# Patient Record
Sex: MALE | Race: WHITE | Employment: OTHER | URBAN - METROPOLITAN AREA
[De-identification: names, ages, dates, MRNs, and addresses within clinical notes are randomized per-mention and may not be internally consistent; named-entity substitution may affect disease eponyms.]

---

## 2017-05-10 ENCOUNTER — HOSPITAL ENCOUNTER (OUTPATIENT)
Dept: ULTRASOUND IMAGING | Age: 42
Discharge: HOME | End: 2017-05-10
Attending: FAMILY MEDICINE
Payer: COMMERCIAL

## 2017-05-10 DIAGNOSIS — R10.9 ABDOMINAL PAIN, UNSPECIFIED SITE: ICD-10-CM

## 2017-05-10 PROCEDURE — 76700 US EXAM ABDOM COMPLETE: CPT

## 2017-08-04 ENCOUNTER — OFFICE VISIT (OUTPATIENT)
Dept: HEMATOLOGY | Age: 42
End: 2017-08-04

## 2017-08-04 VITALS
OXYGEN SATURATION: 97 % | TEMPERATURE: 97.3 F | WEIGHT: 249 LBS | DIASTOLIC BLOOD PRESSURE: 75 MMHG | SYSTOLIC BLOOD PRESSURE: 124 MMHG | HEART RATE: 83 BPM | HEIGHT: 68 IN | BODY MASS INDEX: 37.74 KG/M2

## 2017-08-04 DIAGNOSIS — K76.0 FATTY LIVER: Primary | ICD-10-CM

## 2017-08-04 PROBLEM — L40.9 PSORIASIS: Status: ACTIVE | Noted: 2017-08-04

## 2017-08-04 NOTE — PROGRESS NOTES
[address] [place], [address], [facility], [place]       CADENCE Higgins PA-C Alvira Mohair, NP Lorrane Collins, NP        at [address]     [address] [place] [address], [address] Matilde Rogers  22.     [phone]     FAX: [phone]    at [address], [id number] [place] [address], [address]     [phone]     FAX: [phone]         Patient Care Team:  Ana Branham MD as PCP - General (Family Practice)  Adelaide Newman DO (Dermatology)      Problem List  Date Reviewed: 8/4/2017          Codes Class Noted    Psoriasis ICD-10-CM: L40.9  ICD-9-CM: 696.1  8/4/2017        Fatty liver ICD-10-CM: K76.0  ICD-9-CM: 571.8  8/4/2017                The physicians listed above have asked me to see Vikram Daily in consultation regarding suspected fatty liver disease and its management. All medical records sent by the referring physicians were reviewed including imaging studies     The patient is a 38 y.o.  male who is suspected to have fatty liver disease based upon ultrasound. Serologic evaluation was either not performed or available to me. Ultrasound of the liver was performed in 5/2017. The results of the imaging suggested fatty liver disease. An assessment of liver fibrosis with biopsy or elastography has not been performed. The most recent laboratory studies are not available. The patient notes fatigue,     The patient completes all daily activities without any functional limitations. The patient has not experienced pain in the right side over the liver,     ALLERGIES  Not on File    MEDICATIONS  No current outpatient prescriptions on file. No current facility-administered medications for this visit. SYSTEM REVIEW NOT RELATED TO LIVER DISEASE OR REVIEWED ABOVE:  Constitution systems: Negative for fever, chills, weight gain, weight loss.    Eyes: Negative for visual changes. ENT: Negative for sore throat, painful swallowing. Respiratory: Negative for cough, hemoptysis, SOB. Cardiology: Negative for chest pain, palpitations. GI:  Negative for constipation or diarrhea. : Negative for urinary frequency, dysuria, hematuria, nocturia. Skin: Negative for rash. Hematology: Negative for easy bruising, blood clots. Musculo-skelatal: Negative for back pain, muscle pain, weakness. Neurologic: Negative for headaches, dizziness, vertigo, memory problems not related to HE. Psychology: Negative for anxiety, depression. FAMILY HISTORY:  The father is alive and healthy. The mother is alive and healthy. There is no family history of liver disease. SOCIAL HISTORY:  The patient has never been . The patient has no children. The patient currently smokes 5 cigarettes per week. The patient consumes alcohol on social occasions never in excess. The patient currently works full time . PHYSICAL EXAMINATION:  Visit Vitals    /75 (BP 1 Location: Left arm, BP Patient Position: Sitting)    Pulse 83    Temp 97.3 °F (36.3 °C) (Tympanic)    Ht 5' 8\" (1.727 m)    Wt 249 lb (112.9 kg)    SpO2 97%    BMI 37.86 kg/m2     General: No acute distress. Eyes: Sclera anicteric. ENT: No oral lesions. Thyroid normal.  Nodes: No adenopathy. Skin: No spider angiomata. No jaundice. No palmar erythema. Respiratory: Lungs clear to auscultation. Cardiovascular: Regular heart rate. No murmurs. No JVD. Abdomen: Soft non-tender. Liver size normal to percussion/palpation. Spleen not palpable. No obvious ascites. Extremities: No edema. No muscle wasting. No gross arthritic changes. Neurologic: Alert and oriented. Cranial nerves grossly intact. No asterixis.     LABORATORY STUDIES:  [facility] [place] [facility] [address] [place] [address] & Units 8/4/2017   WBC 3.4 - 10.8 x10E3/uL 9.3   ANC 1.4 - 7.0 x10E3/uL 6.3   HGB 12.6 - 17.7 g/dL 15.3    - 379 x10E3/uL 309   AST 0 - 40 IU/L 36   ALT 0 - 44 IU/L 78 (H)   Alk Phos 39 - 117 IU/L 59   Bili, Total 0.0 - 1.2 mg/dL 0.5   Bili, Direct 0.00 - 0.40 mg/dL 0.14   Albumin 3.5 - 5.5 g/dL 4.8   BUN 6 - 24 mg/dL 9   Creat 0.76 - 1.27 mg/dL 0.81   Na 134 - 144 mmol/L 142   K 3.5 - 5.2 mmol/L 4.7   Cl 96 - 106 mmol/L 101   CO2 18 - 29 mmol/L 26   Glucose 65 - 99 mg/dL 83     SEROLOGIES:  Serologies Latest Ref Rng & Units 8/4/2017   Hep A Ab, Total Negative Negative   Hep B Surface Ag Negative Negative   Hep B Core Ab, Total Negative Negative   Hep B Surface AB QL  Non Reactive   Hep C Ab 0.0 - 0.9 s/co ratio <0.1   Ferritin 30 - 400 ng/mL 308   Iron % Saturation 15 - 55 % 34     LIVER HISTOLOGY:  Not available or performed    ENDOSCOPIC PROCEDURES:  Not available or performed    RADIOLOGY:  5/2017. Ultrasound of liver. Echogenic consistent with chronic liver disease. No liver mass lesions. No dilated bile ducts. No ascites. OTHER TESTING:  Not available or performed    ASSESSMENT AND PLAN:  Suspected fatty liver disease of unclear histologic severity. Liver transaminases are elevated. Alkaline phosphate is normal.  Liver function is normal.  The platelet count is normal.      Based upon laboratory studies and imaging  the patient does not appear to have advanced fibrosis     Will perform laboratory testing to monitor liver function and degree of liver injury. This included BMP, hepatic panel, CBC with platelet count, INR. Will perform serologic and virologic studies to assess for other causes of chronic liver disease. Suspect the patient has fatty liver based upon ultrasound, an elevation in serum liver transaminases, and serology that is negative for all other causes of chronic liver disease. Only a liver biopsy can confirm is the patient does have fatty liver and differentiate benign steatosis from URRUTIA. The treatment is the same; weight reduction.  If the liver biopsy demonstrates URRUTIA the patient would be eligible for enrollment into clinical trials for treatment of URRUTIA. The need to perform liver biopsy to assess disease severity was discussed with the patient. The risks of performing the liver biopsy including pain, puncture of the lung, gallbladder, intestine or kidney and bleeding were discussed. Will defer liver biopsy for now. The need to perform an assessment of liver fibrosis was discussed with the patient. The Fibroscan can assess liver fibrosis and determine if a patient has advanced fibrosis or cirrhosis without the need for liver biopsy. The Fibroscan is currently available at [facility]. This will be performed at the next office visit. The patient was counseled regarding diet and exercise to achieve weight loss. The best diet for patients with fatty liver is one very low in carbohydrates and enriched with protein such as an Atkins program.      We will continue to monitor the patient at periodic intervals. If weight loss is successful the fat will resolve from the liver and liver enzymes should return to the normal range. We would then repeat an ultrasound to see if this also returns to normal.  If liver enzymes do not return to normal with weight reduction additional evaluation may be necessary over time. The patient was directed to continue all current medications at the current dosages. There are no contraindications for the patient to take any medications that are necessary for treatment of other medical issues including medications for diabetes mellitus and hypercholesterolemia. The patient was counseled regarding alcohol consumption and that this could contribute to fatty liver disease. Vaccination for viral hepatitis A and B is recommended since the patient has no serologic evidence of previous exposure or vaccination with immunity. All of the above issues were discussed with the patient.   All questions were answered. The patient expressed a clear understanding of the above. [address] [address] in 3 months to review all data and determine the treatment plan.     Aura Romberg, MD  [facility] [place] [address] 27[address] [facility] [address].  [phone]

## 2017-08-04 NOTE — MR AVS SNAPSHOT
Visit Information Date & Time Provider Department Dept. Phone Encounter #  
 8/4/2017 10:30 AM Nanda Garcia MD [facility] [address] [place] [address] [id number] Follow-up Instructions Return in about 2 months (around 10/4/2017) for FS with NP. Upcoming Health Maintenance Date Due DTaP/Tdap/Td series (1 - Tdap) 7/26/1998 INFLUENZA AGE 9 TO ADULT 8/1/2017 Allergies as of 8/4/2017  Review Complete On: 8/4/2017 By: Isma Bernstein LPN Not on File Current Immunizations  Never Reviewed No immunizations on file. Not reviewed this visit You Were Diagnosed With   
  
 Codes Comments Fatty liver    -  Primary ICD-10-CM: K76.0 ICD-9-CM: 571.8 Vitals BP Pulse Temp Height(growth percentile) 124/75 (BP 1 Location: Left arm, BP Patient Position: Sitting) 83 97.3 °F (36.3 °C) (Tympanic) 5' 8\" (1.727 m) Weight(growth percentile) SpO2 BMI Smoking Status 249 lb (112.9 kg) 97% 37.86 kg/m2 Current Some Day Smoker BMI and BSA Data Body Mass Index Body Surface Area  
 37.86 kg/m 2 2.33 m 2 Your Updated Medication List  
  
Notice  As of 8/4/2017 11:42 AM  
 You have not been prescribed any medications. We Performed the Following CBC WITH AUTOMATED DIFF [62197 CPT(R)] FERRITIN [20145 CPT(R)] HCV AB W/REFLEX VERIFICATION [[id number] Custom] HEP A AB, TOTAL [id number] CPT(R)] HEP B SURFACE AB [id number] CPT(R)] HEP B SURFACE AG [id number] CPT(R)] HEPATIC FUNCTION PANEL [62772 CPT(R)] HEPATITIS B CORE AB, TOTAL [id number] CPT(R)] IRON PROFILE S765335 CPT(R)] METABOLIC PANEL, BASIC [76658 CPT(R)] Follow-up Instructions Return in about 2 months (around 10/4/2017) for FS with NP. Introducing [facility]! Dear Janet Dobson: Thank you for requesting a [employer] account. Our records indicate that you already have an active [employer] account.   You can access your account anytime at [email or web address] Did you know that you can access your hospital and ER discharge instructions at any time in [employer]? You can also review all of your test results from your hospital stay or ER visit. Additional Information If you have questions, please visit the Frequently Asked Questions section of the [employer] website at [email or web address] Remember, [employer] is NOT to be used for urgent needs. For medical emergencies, dial 911. Now available from your iPhone and Android! Please provide this summary of care documentation to your next provider. Your primary care clinician is listed as Rohit King. If you have any questions after today's visit, please call [phone].

## 2017-08-05 LAB
ALBUMIN SERPL-MCNC: 4.8 G/DL (ref 3.5–5.5)
ALP SERPL-CCNC: 59 IU/L (ref 39–117)
ALT SERPL-CCNC: 78 IU/L (ref 0–44)
AST SERPL-CCNC: 36 IU/L (ref 0–40)
BASOPHILS # BLD AUTO: 0.1 X10E3/UL (ref 0–0.2)
BASOPHILS NFR BLD AUTO: 1 %
BILIRUB DIRECT SERPL-MCNC: 0.14 MG/DL (ref 0–0.4)
BILIRUB SERPL-MCNC: 0.5 MG/DL (ref 0–1.2)
BUN SERPL-MCNC: 9 MG/DL (ref 6–24)
BUN/CREAT SERPL: 11 (ref 9–20)
CALCIUM SERPL-MCNC: 9.5 MG/DL (ref 8.7–10.2)
CHLORIDE SERPL-SCNC: 101 MMOL/L (ref 96–106)
CO2 SERPL-SCNC: 26 MMOL/L (ref 18–29)
COMMENT, 144067: NORMAL
CREAT SERPL-MCNC: 0.81 MG/DL (ref 0.76–1.27)
EOSINOPHIL # BLD AUTO: 0.2 X10E3/UL (ref 0–0.4)
EOSINOPHIL NFR BLD AUTO: 3 %
ERYTHROCYTE [DISTWIDTH] IN BLOOD BY AUTOMATED COUNT: 13.1 % (ref 12.3–15.4)
FERRITIN SERPL-MCNC: 308 NG/ML (ref 30–400)
GLUCOSE SERPL-MCNC: 83 MG/DL (ref 65–99)
HAV AB SER QL IA: NEGATIVE
HBV CORE AB SERPL QL IA: NEGATIVE
HBV SURFACE AB SER QL: NON REACTIVE
HBV SURFACE AG SERPL QL IA: NEGATIVE
HCT VFR BLD AUTO: 46.5 % (ref 37.5–51)
HCV AB S/CO SERPL IA: <0.1 S/CO RATIO (ref 0–0.9)
HGB BLD-MCNC: 15.3 G/DL (ref 12.6–17.7)
IMM GRANULOCYTES # BLD: 0.1 X10E3/UL (ref 0–0.1)
IMM GRANULOCYTES NFR BLD: 1 %
IRON SATN MFR SERPL: 34 % (ref 15–55)
IRON SERPL-MCNC: 111 UG/DL (ref 38–169)
LYMPHOCYTES # BLD AUTO: 2 X10E3/UL (ref 0.7–3.1)
LYMPHOCYTES NFR BLD AUTO: 21 %
MCH RBC QN AUTO: 30.3 PG (ref 26.6–33)
MCHC RBC AUTO-ENTMCNC: 32.9 G/DL (ref 31.5–35.7)
MCV RBC AUTO: 92 FL (ref 79–97)
MONOCYTES # BLD AUTO: 0.7 X10E3/UL (ref 0.1–0.9)
MONOCYTES NFR BLD AUTO: 7 %
NEUTROPHILS # BLD AUTO: 6.3 X10E3/UL (ref 1.4–7)
NEUTROPHILS NFR BLD AUTO: 67 %
PLATELET # BLD AUTO: 309 X10E3/UL (ref 150–379)
POTASSIUM SERPL-SCNC: 4.7 MMOL/L (ref 3.5–5.2)
PROT SERPL-MCNC: 7.3 G/DL (ref 6–8.5)
RBC # BLD AUTO: 5.05 X10E6/UL (ref 4.14–5.8)
SODIUM SERPL-SCNC: 142 MMOL/L (ref 134–144)
TIBC SERPL-MCNC: 329 UG/DL (ref 250–450)
UIBC SERPL-MCNC: 218 UG/DL (ref 111–343)
WBC # BLD AUTO: 9.3 X10E3/UL (ref 3.4–10.8)

## 2019-05-06 ENCOUNTER — OFFICE VISIT (OUTPATIENT)
Dept: URGENT CARE | Age: 44
End: 2019-05-06

## 2019-05-06 VITALS
HEIGHT: 68 IN | TEMPERATURE: 98.2 F | DIASTOLIC BLOOD PRESSURE: 89 MMHG | RESPIRATION RATE: 18 BRPM | BODY MASS INDEX: 38.04 KG/M2 | SYSTOLIC BLOOD PRESSURE: 148 MMHG | HEART RATE: 87 BPM | OXYGEN SATURATION: 97 % | WEIGHT: 251 LBS

## 2019-05-06 DIAGNOSIS — H01.00B BLEPHARITIS OF UPPER AND LOWER EYELIDS OF BOTH EYES, UNSPECIFIED TYPE: ICD-10-CM

## 2019-05-06 DIAGNOSIS — L40.9 PSORIASIS OF SCALP: Primary | ICD-10-CM

## 2019-05-06 DIAGNOSIS — H01.00A BLEPHARITIS OF UPPER AND LOWER EYELIDS OF BOTH EYES, UNSPECIFIED TYPE: ICD-10-CM

## 2019-05-06 PROBLEM — E66.01 SEVERE OBESITY (HCC): Status: ACTIVE | Noted: 2019-05-06

## 2019-05-06 RX ORDER — NEOMYCIN SULFATE, POLYMYXIN B SULFATE, AND DEXAMETHASONE 3.5; 10000; 1 MG/G; [USP'U]/G; MG/G
OINTMENT OPHTHALMIC
Qty: 1 TUBE | Refills: 0 | Status: SHIPPED | OUTPATIENT
Start: 2019-05-06

## 2019-05-06 RX ORDER — CLOBETASOL PROPIONATE 0.5 MG/G
CREAM TOPICAL 2 TIMES DAILY
Qty: 45 G | Refills: 0 | Status: SHIPPED | OUTPATIENT
Start: 2019-05-06

## 2019-05-06 RX ORDER — NEOMYCIN SULFATE, POLYMYXIN B SULFATE, AND DEXAMETHASONE 3.5; 10000; 1 MG/G; [USP'U]/G; MG/G
OINTMENT OPHTHALMIC
Qty: 1 TUBE | Refills: 0 | Status: SHIPPED | OUTPATIENT
Start: 2019-05-06 | End: 2019-05-06 | Stop reason: SDUPTHER

## 2019-05-06 RX ORDER — CLOBETASOL PROPIONATE 0.5 MG/G
CREAM TOPICAL 2 TIMES DAILY
Qty: 45 G | Refills: 0 | Status: SHIPPED | OUTPATIENT
Start: 2019-05-06 | End: 2019-05-06 | Stop reason: SDUPTHER

## 2019-05-06 NOTE — PROGRESS NOTES
Eye Pain   This is a new problem. The current episode started more than 1 week ago. The problem occurs constantly. The problem has not changed since onset. Associated symptoms comments: Bilateral itching and crusting on eyelids  Dry eye/ irritation- burning . Nothing relieves the symptoms. History reviewed. No pertinent past medical history. History reviewed. No pertinent surgical history. History reviewed. No pertinent family history. Social History     Socioeconomic History    Marital status: SINGLE     Spouse name: Not on file    Number of children: Not on file    Years of education: Not on file    Highest education level: Not on file   Occupational History    Not on file   Social Needs    Financial resource strain: Not on file    Food insecurity:     Worry: Not on file     Inability: Not on file    Transportation needs:     Medical: Not on file     Non-medical: Not on file   Tobacco Use    Smoking status: Former Smoker     Packs/day: 1.00    Smokeless tobacco: Never Used    Tobacco comment: one pack per month   Substance and Sexual Activity    Alcohol use: Not on file    Drug use: Not on file    Sexual activity: Not on file   Lifestyle    Physical activity:     Days per week: Not on file     Minutes per session: Not on file    Stress: Not on file   Relationships    Social connections:     Talks on phone: Not on file     Gets together: Not on file     Attends Nondenominational service: Not on file     Active member of club or organization: Not on file     Attends meetings of clubs or organizations: Not on file     Relationship status: Not on file    Intimate partner violence:     Fear of current or ex partner: Not on file     Emotionally abused: Not on file     Physically abused: Not on file     Forced sexual activity: Not on file   Other Topics Concern    Not on file   Social History Narrative    Not on file                ALLERGIES: Patient has no known allergies.     Review of Systems   Eyes: Positive for pain. All other systems reviewed and are negative. Vitals:    05/06/19 1719   BP: 148/89   Pulse: 87   Resp: 18   Temp: 98.2 °F (36.8 °C)   SpO2: 97%   Weight: 251 lb (113.9 kg)   Height: 5' 8\" (1.727 m)       Physical Exam   Constitutional: No distress. HENT:   Right Ear: Tympanic membrane and ear canal normal.   Left Ear: Tympanic membrane and ear canal normal.   Nose: Nose normal.   Mouth/Throat: No oropharyngeal exudate, posterior oropharyngeal edema or posterior oropharyngeal erythema. Eyes: Conjunctivae are normal. Right eye exhibits no discharge, no exudate and no hordeolum. Left eye exhibits no discharge, no exudate and no hordeolum. Dry/irritated eyelids with mild erythema and crusting - bilateral-    Neck: Neck supple. Pulmonary/Chest: Effort normal and breath sounds normal. No respiratory distress. He has no wheezes. He has no rales. Lymphadenopathy:     He has no cervical adenopathy. Skin: No rash noted. Nursing note and vitals reviewed. MDM    Procedures      ICD-10-CM ICD-9-CM    1. Psoriasis of scalp L40.9 696.1    2. Blepharitis of upper and lower eyelids of both eyes, unspecified type H01.00A 373.00     H01.00B         Wash eyes with warm water and baby shampoo  Use Zaditor eye   Medications Ordered Today   Medications    DISCONTD: neomycin-polymyxin-dexamethasone (DEXACINE) 3.5 mg/g-10,000 unit/g-0.1 % ophthalmic ointment     Sig: Apply thin film both eye 3 times/ day     Dispense:  1 Tube     Refill:  0    DISCONTD: clobetasol (TEMOVATE) 0.05 % topical cream     Sig: Apply  to affected area two (2) times a day. Dispense:  45 g     Refill:  0    clobetasol (TEMOVATE) 0.05 % topical cream     Sig: Apply  to affected area two (2) times a day.      Dispense:  45 g     Refill:  0    neomycin-polymyxin-dexamethasone (DEXACINE) 3.5 mg/g-10,000 unit/g-0.1 % ophthalmic ointment     Sig: Apply thin film both eye 3 times/ day     Dispense:  1 Tube Refill:  0     No results found for any visits on 05/06/19. The patients condition was discussed with the patient and they understand. The patient is to follow up with primary care doctor. If signs and symptoms become worse the pt is to go to the ER. The patient is to take medications as prescribed.

## 2019-05-06 NOTE — PATIENT INSTRUCTIONS
Blepharitis: Care Instructions  Your Care Instructions    Blepharitis is an inflammation or infection of the eyelids. It causes dry, scaly crusts on the eyelids. It can also cause your eyes to itch, burn, and look red. This problem is more common in people who have rosacea, dandruff, skin allergies, or eczema. Home treatment can help you keep your eyes comfortable. Your doctor may also prescribe an ointment to put on your eyelids. Follow-up care is a key part of your treatment and safety. Be sure to make and go to all appointments, and call your doctor if you are having problems. It's also a good idea to know your test results and keep a list of the medicines you take. How can you care for yourself at home? · Wash your eyelids and eyebrows daily with baby shampoo. To wash your eyelids:  ? Place a very warm washcloth over your eyes for about a minute. This will help soften and loosen the crusts on your eyelashes. ? Put a few drops of baby shampoo on a warm washcloth. ? Gently wipe your eyelids. This helps remove any crust. It also cleans your eyelids. ? Rinse well with water. · Be safe with medicines. If your doctor prescribed medicine for you, use it exactly as directed. Call your doctor if you think you are having a problem with your medicine. When should you call for help? Call your doctor now or seek immediate medical care if:    · You have signs of an eye infection, such as:  ? Pus or thick discharge coming from the eye.  ? Redness or swelling around the eye.  ? A fever.    Watch closely for changes in your health, and be sure to contact your doctor if:    · You have vision changes.     · You do not get better as expected. Where can you learn more? Go to http://joni-mary.info/. Enter F474 in the search box to learn more about \"Blepharitis: Care Instructions. \"  Current as of: July 17, 2018  Content Version: 11.9  © 1351-0422 [employer].  Care instructions adapted under license by [employer] (which disclaims liability or warranty for this information). If you have questions about a medical condition or this instruction, always ask your healthcare professional. [address] any warranty or liability for your use of this information.